# Patient Record
Sex: MALE | Race: WHITE | NOT HISPANIC OR LATINO | Employment: FULL TIME | URBAN - METROPOLITAN AREA
[De-identification: names, ages, dates, MRNs, and addresses within clinical notes are randomized per-mention and may not be internally consistent; named-entity substitution may affect disease eponyms.]

---

## 2019-06-14 ENCOUNTER — TELEPHONE (OUTPATIENT)
Dept: SCHEDULING | Facility: IMAGING CENTER | Age: 39
End: 2019-06-14

## 2019-06-17 ENCOUNTER — OFFICE VISIT (OUTPATIENT)
Dept: MEDICAL GROUP | Facility: MEDICAL CENTER | Age: 39
End: 2019-06-17
Payer: COMMERCIAL

## 2019-06-17 VITALS
HEIGHT: 66 IN | TEMPERATURE: 98.1 F | BODY MASS INDEX: 35.03 KG/M2 | HEART RATE: 71 BPM | SYSTOLIC BLOOD PRESSURE: 130 MMHG | DIASTOLIC BLOOD PRESSURE: 80 MMHG | WEIGHT: 218 LBS | OXYGEN SATURATION: 97 % | RESPIRATION RATE: 16 BRPM

## 2019-06-17 DIAGNOSIS — F41.9 ANXIETY: ICD-10-CM

## 2019-06-17 DIAGNOSIS — R53.83 OTHER FATIGUE: ICD-10-CM

## 2019-06-17 DIAGNOSIS — Z13.6 SCREENING FOR CARDIOVASCULAR CONDITION: ICD-10-CM

## 2019-06-17 DIAGNOSIS — E66.9 OBESITY (BMI 35.0-39.9 WITHOUT COMORBIDITY): ICD-10-CM

## 2019-06-17 DIAGNOSIS — F41.0 PANIC ATTACKS: ICD-10-CM

## 2019-06-17 DIAGNOSIS — F17.220 CHEWING TOBACCO NICOTINE DEPENDENCE WITHOUT COMPLICATION: ICD-10-CM

## 2019-06-17 DIAGNOSIS — G47.26 SLEEP DISORDER, SHIFT-WORK: ICD-10-CM

## 2019-06-17 DIAGNOSIS — Z76.89 ENCOUNTER TO ESTABLISH CARE: ICD-10-CM

## 2019-06-17 DIAGNOSIS — Z85.6 HISTORY OF LEUKEMIA: ICD-10-CM

## 2019-06-17 PROCEDURE — 99203 OFFICE O/P NEW LOW 30 MIN: CPT | Performed by: NURSE PRACTITIONER

## 2019-06-17 RX ORDER — CITALOPRAM HYDROBROMIDE 10 MG/1
10 TABLET ORAL DAILY
Qty: 30 TAB | Refills: 1 | Status: SHIPPED | OUTPATIENT
Start: 2019-06-17 | End: 2022-07-12

## 2019-06-17 ASSESSMENT — PATIENT HEALTH QUESTIONNAIRE - PHQ9: CLINICAL INTERPRETATION OF PHQ2 SCORE: 0

## 2019-06-17 NOTE — PROGRESS NOTES
"CC: establish care/anxiety/panic attacks      Higinio Jamison is a 39 y.o. male here to establish care and to discuss the evaluation and management of:    Patient is a 39-year-old  male here to establish care.  States that he does not have a primary care physician.  Medical history includes leukemia as a teenager.  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  Surgical history includes evacuation of hematoma on his brain.  Family history includes cervical cancer, hyperlipidemia and hypertension for his mother, lupus for his father.  Coronary artery disease for his maternal grandfather.  He states that he does have a cigar every once in a while and he currently uses chewing tobacco.    Anxiety/panic attacks   Patient states that he has been having an increase in anxiety and panic attacks within the last 2 months.  Patient states that he typically has this at work and in busy crowded environments.  Patient states he always has to \"see an exit. \"States he does not do well in crowds.  Patient states that his work is stressful exertion over the last year when he transition to night shift.  States that he can feel his heart rate \"shooting up \"in his peripheral vision begins to narrow into \"tunnel vision.\" He tries to reduce his anxiety and stress with deep breathing and sitting down.  Sometimes he can bring his anxiety and stress down but lately has been having a hard time.    Sleep disruption   He works [employer] and takes Melatonin for the past year.  He has been feeling fatigued.    History of Leukemia   Patient states when he was a teenager he was diagnosed with leukemia.  He was treated with chemotherapy at [facility] in [place]. Remission since age 26.     History of craniotomy  Age 15 he had a traumatic brain injury.  He required a craniotomy for evacuation of hematoma.  He has a large scar on the posterior aspect of his skull.          ROS:  Denies any " Headache, Blurred Vision, Confusion, Chest pain,  Shortness of breath,  Abdominal pain, Changes of bowel or bladder, Hematuria, Hematochezia, Lower ext. edema, Fevers, Nights sweats, Weight Changes, Focal weakness or numbness.  And all other systems are negative.  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 The fourth and fifth rib fractures are displaced. The anterior segments of the left fourth through eighth ribs are fractured. Blunt chest protocol.  1/18 -  Switch to oral medication Aggressive pain management and pulmonary hygiene.    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 CT head on admission ok 1/18 - Cog eval pending      Past Surgical History:   Procedure Laterality Date   • OTHER      removed blood clot from brain     Family History   Problem Relation Age of Onset   • Cancer Mother         cervical   • Hyperlipidemia Mother    • Hypertension Mother    • Other Father         lupus   • Alcohol abuse Brother    • Heart Disease Maternal Grandfather         CABGx4   • Arthritis Paternal Grandmother      Social History     Social History   • Marital status: Single     Spouse name: N/A   • Number of children: N/A   • Years of education: N/A     Occupational History   • Not on file.     Social History Main Topics   • Smoking status: Current Some Day  "Smoker     Types: Cigars   • Smokeless tobacco: Current User     Types: Chew      Comment: cigar every once in a while   • Alcohol use No   • Drug use: Yes     Types: Marijuana   • Sexual activity: Not on file     Other Topics Concern   • Not on file     Social History Narrative    ** Merged History Encounter **            Objective:     Vitals: /80   Pulse 71   Temp 36.7 °C (98.1 °F)   Resp 16   Ht 1.676 m (5' 6\")   Wt 98.9 kg (218 lb)   SpO2 97%   BMI 35.19 kg/m²      General: Alert, pleasant, NAD  HEENT:  Normocephalic.   Neck supple.  No thyromegaly or masses palpated. No cervical or supraclavicular lymphadenopathy.  Heart:  Regular rate and rhythm.  S1 and S2 normal.  No murmurs appreciated.    Respiratory:  Normal respiratory effort.  Clear to auscultation bilaterally.    Skin:  Warm, dry, no rashes  Musculoskeletal:  Gait is normal.  Moves all extremities well.  Extremities:No leg edema.  Neurological: No tremors, sensation grossly intact,   Psych:  Affect/mood is normal, judgement is good, memory is intact, grooming is appropriate.      Assessment and Plan.   39 y.o. male to establish care and discuss the following    1. Anxiety  Not well controlled.  Worsening over the last few months.  Have reviewed possible medications such as citalopram or Lexapro.  Will have patient start on citalopram 10 mg daily and he will follow back up in 6 to 8 weeks.  Have cautioned regarding side effects.    - Comp Metabolic Panel; Future  - TSH WITH REFLEX TO FT4; Future  - CBC WITH DIFFERENTIAL; Future  - citalopram (CELEXA) 10 MG tablet; Take 1 Tab by mouth every day.  Dispense: 30 Tab; Refill: 1    2. Panic attacks  Worsening over the last few months.  He does have a stressful job and works night shift.  Continue to work on behavioral techniques and follow-up after 6 months of citalopram.  - CBC WITH DIFFERENTIAL; Future  - citalopram (CELEXA) 10 MG tablet; Take 1 Tab by mouth every day.  Dispense: 30 Tab; " Refill: 1    3. Sleep disorder, shift-work  4. Other fatigue  Patient reports that melatonin is somewhat effective for this.  He is currently on night shift at this time.  He hopes to transition back to dayshift.    5. History of leukemia  Has been in remission since the age of 26.  Encouraged tobacco cessation    6. Chewing tobacco nicotine dependence without complication  Discussed with patient the importance of reducing tobacco consumption. Educated patient regarding the effects of tobacco use on cardiovascular system.    7. Screening for cardiovascular condition  - Lipid Profile; Future    8. Obesity (BMI 35.0-39.9 without comorbidity)  Chronic. Patient encouraged to reduce excess calorie consumption. Encouraged regular exercise. Discussed long term sequelae of obesity.  - Patient identified as having weight management issue.  Appropriate orders and counseling given.    9. Encounter to establish care        No Follow-up on file.          Kandace PEDRAZA.

## 2019-07-10 LAB
ALBUMIN SERPL-MCNC: 4.6 G/DL (ref 3.5–5.5)
ALBUMIN/GLOB SERPL: 1.6 {RATIO} (ref 1.2–2.2)
ALP SERPL-CCNC: 63 IU/L (ref 39–117)
ALT SERPL-CCNC: 27 IU/L (ref 0–44)
AST SERPL-CCNC: 20 IU/L (ref 0–40)
BASOPHILS # BLD AUTO: 0 X10E3/UL (ref 0–0.2)
BASOPHILS NFR BLD AUTO: 0 %
BILIRUB SERPL-MCNC: 0.4 MG/DL (ref 0–1.2)
BUN SERPL-MCNC: 12 MG/DL (ref 6–20)
BUN/CREAT SERPL: 12 (ref 9–20)
CALCIUM SERPL-MCNC: 9.9 MG/DL (ref 8.7–10.2)
CHLORIDE SERPL-SCNC: 98 MMOL/L (ref 96–106)
CHOLEST SERPL-MCNC: 283 MG/DL (ref 100–199)
CO2 SERPL-SCNC: 24 MMOL/L (ref 20–29)
CREAT SERPL-MCNC: 0.97 MG/DL (ref 0.76–1.27)
EOSINOPHIL # BLD AUTO: 0.2 X10E3/UL (ref 0–0.4)
EOSINOPHIL NFR BLD AUTO: 2 %
ERYTHROCYTE [DISTWIDTH] IN BLOOD BY AUTOMATED COUNT: 14.5 % (ref 12.3–15.4)
GLOBULIN SER CALC-MCNC: 2.9 G/DL (ref 1.5–4.5)
GLUCOSE SERPL-MCNC: 93 MG/DL (ref 65–99)
HCT VFR BLD AUTO: 49.2 % (ref 37.5–51)
HDLC SERPL-MCNC: 27 MG/DL
HGB BLD-MCNC: 17.1 G/DL (ref 13–17.7)
IMM GRANULOCYTES # BLD AUTO: 0 X10E3/UL (ref 0–0.1)
IMM GRANULOCYTES NFR BLD AUTO: 0 %
IMMATURE CELLS  115398: ABNORMAL
LABORATORY COMMENT REPORT: ABNORMAL
LDLC SERPL CALC-MCNC: ABNORMAL MG/DL (ref 0–99)
LYMPHOCYTES # BLD AUTO: 3.8 X10E3/UL (ref 0.7–3.1)
LYMPHOCYTES NFR BLD AUTO: 42 %
MCH RBC QN AUTO: 31.1 PG (ref 26.6–33)
MCHC RBC AUTO-ENTMCNC: 34.8 G/DL (ref 31.5–35.7)
MCV RBC AUTO: 90 FL (ref 79–97)
MONOCYTES # BLD AUTO: 0.8 X10E3/UL (ref 0.1–0.9)
MONOCYTES NFR BLD AUTO: 9 %
MORPHOLOGY BLD-IMP: ABNORMAL
NEUTROPHILS # BLD AUTO: 4.3 X10E3/UL (ref 1.4–7)
NEUTROPHILS NFR BLD AUTO: 47 %
NRBC BLD AUTO-RTO: ABNORMAL %
PLATELET # BLD AUTO: 175 X10E3/UL (ref 150–450)
POTASSIUM SERPL-SCNC: 4.2 MMOL/L (ref 3.5–5.2)
PROT SERPL-MCNC: 7.5 G/DL (ref 6–8.5)
RBC # BLD AUTO: 5.49 X10E6/UL (ref 4.14–5.8)
SODIUM SERPL-SCNC: 138 MMOL/L (ref 134–144)
T4 FREE SERPL-MCNC: 1.15 NG/DL (ref 0.82–1.77)
TRIGL SERPL-MCNC: 845 MG/DL (ref 0–149)
TSH SERPL DL<=0.005 MIU/L-ACNC: 7.25 UIU/ML (ref 0.45–4.5)
VLDLC SERPL CALC-MCNC: ABNORMAL MG/DL (ref 5–40)
WBC # BLD AUTO: 9.1 X10E3/UL (ref 3.4–10.8)

## 2019-07-11 PROBLEM — E78.1 HYPERTRIGLYCERIDEMIA: Status: ACTIVE | Noted: 2019-07-11

## 2019-07-12 PROBLEM — R79.89 ELEVATED TSH: Status: ACTIVE | Noted: 2019-07-12

## 2019-07-12 PROBLEM — E78.1 HYPERTRIGLYCERIDEMIA: Chronic | Status: ACTIVE | Noted: 2019-07-11

## 2019-07-15 ENCOUNTER — OFFICE VISIT (OUTPATIENT)
Dept: MEDICAL GROUP | Facility: MEDICAL CENTER | Age: 39
End: 2019-07-15
Payer: COMMERCIAL

## 2019-07-15 VITALS
DIASTOLIC BLOOD PRESSURE: 80 MMHG | RESPIRATION RATE: 16 BRPM | HEART RATE: 87 BPM | TEMPERATURE: 98.4 F | OXYGEN SATURATION: 100 % | HEIGHT: 66 IN | WEIGHT: 216 LBS | BODY MASS INDEX: 34.72 KG/M2 | SYSTOLIC BLOOD PRESSURE: 132 MMHG

## 2019-07-15 DIAGNOSIS — E78.1 HYPERTRIGLYCERIDEMIA: Chronic | ICD-10-CM

## 2019-07-15 DIAGNOSIS — R79.89 TSH ELEVATION: ICD-10-CM

## 2019-07-15 PROCEDURE — 99213 OFFICE O/P EST LOW 20 MIN: CPT | Performed by: NURSE PRACTITIONER

## 2019-07-15 NOTE — PATIENT INSTRUCTIONS
Lipid Blood Tests  Blood lipids are fats found in the circulation. Cholesterol and triglycerides are the two main lipids measured for determining your risk of getting heart and blood vessel diseases. The cholesterol in the blood is attached to two different molecules called lipoproteins. HDL is the beneficial high density lipoprotein cholesterol which reduces coronary risk. Low density lipoprotein cholesterol, the LDL, carries an increased risk for heart and vascular disease when it is high. Most of the body's fat tissue is in the form of triglycerides. Medical conditions that elevate the blood triglyceride level include diabetes, thyroid, kidney, and liver diseases.   A lipid panel usually includes the total cholesterol, LDL, and HDL blood levels. For best results, you should fast overnight before the blood tests are drawn. The following risk factors are generally accepted for different lipids:  · LDL - Below 100 means low risk, 130-160 borderline risk, 160-190 high risk, above 190 is considered very high risk.   · HDL - Below 40 means high risk, 40-60 average risk, 60 and higher means a reduced risk for heart and blood vessel diseases.   · Total cholesterol - Below 200 means low risk, 200-240 is borderline risk, above 240 is higher risk.   · Triglycerides - Below 200 means low risk, 200-400 borderline risk, above 400 means increased risk.   Many factors contribute to lipid levels including genetics, diet, exercise, body fat and medications. Reducing saturated fats in the diet and increasing fiber with fruits, vegetables and whole grains have been shown to improve blood lipids. Drugs may be prescribed to lower lipids if diet and exercise alone do not help bring the cholesterol levels under control.  Document Released: 01/25/2006 Document Revised: 03/11/2013 Document Reviewed: 12/18/2006  [employer]® Patient Information ©2013 [employer].

## 2019-07-15 NOTE — PROGRESS NOTES
cc:  Review labs/high triglycerides      Subjective:     HPI:     Higinio Jamison is a 39 y.o. male here to discuss the evaluation and management of:    1. Hypertriglyceridemia  Patient is here today to review his most recent labs.  His most recent total cholesterol was 283, triglycerides 845, HDL 27 and LDLs unable to calculate.  Patient states he has not had any alcohol use about 5 years.  States that he does endorse a high animal protein diet as well as fast food diet.  He does have a family history of heart disease, hypertension and hyperlipidemia.  Patient does make note that the week prior he was having abdominal discomfort, nausea vomiting and diarrhea.  Have reviewed old labs from 2015 with a normal triglyceride level.    2. TSH elevation  TSH was slightly elevated on most recent labs.  Denies constipation, weight gain or skin changes.    ROS:  Denies any Headache, Blurred Vision, Confusion, Chest pain,  Shortness of breath,  Abdominal pain, Changes of bowel or bladder, Lower ext edema, Fevers, Nights sweats, Weight Changes, Focal weakness or numbness.  And all other systems are negative.        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 The fourth and fifth rib fractures are displaced. The anterior segments of the left fourth through eighth ribs are fractured. Blunt chest protocol.  1/18 -  Switch to oral medication Aggressive pain management and pulmonary hygiene.    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 CT head on admission ok 1/18 - Cog eval pending      Past Surgical History:   Procedure Laterality Date   • OTHER      removed blood clot from brain     Family History   Problem Relation Age of Onset   • Cancer Mother         cervical   • Hyperlipidemia Mother    • Hypertension Mother    • Other Father         lupus   • Alcohol abuse Brother    • Heart Disease Maternal Grandfather         CABGx4   • Arthritis Paternal Grandmother      Social History     Social History   • Marital status: Single     Spouse name: N/A   • Number of children: N/A   • Years of education: N/A     Occupational History   • Not on file.     Social History Main Topics   • Smoking status: Current Some Day Smoker     Types: Cigars   • Smokeless tobacco: Current User     Types: Chew      Comment: cigar every once in a while   • Alcohol use No   • Drug use: Yes     Types: Marijuana   • Sexual activity: Not on file     Other Topics Concern   • Not on file     Social History Narrative    ** Merged History Encounter **            Objective:     Vitals: /80   Pulse 87   Temp 36.9 °C (98.4 °F)   Resp 16   Ht 1.676 m (5' 6\")   Wt 98 kg (216 lb)   SpO2 100%   BMI 34.86 kg/m²    General: Alert, pleasant, NAD  HEENT: Normocephalic.   Skin: Warm, dry, no rashes.  Extremities: No leg edema. No discoloration  Neurological: No tremors  Psych:  Affect/mood is normal, judgement is good, memory is intact, grooming is appropriate.    Assessment/Plan:     Diagnoses and all orders for this visit:    Hypertriglyceridemia  Repeat in approximately 1 month as he has been making some dietary changes since his last labs.  Would like to rule out any lab error or any sort of acute process as he does have a history of having normal triglycerides in 2015.  Have also talked about following up with a vascular medicine specialist to ensure " that there is not any familial traits.  Patient states he is amenable to this.  Also discussed with him that if his triglycerides and cholesterol not improved then I recommend rosuvastatin.  -     Comp Metabolic Panel; Future  -     Lipid Profile; Future    TSH elevation  Repeat in 3 months.  -     TSH WITH REFLEX TO FT4; Future        Return in about 3 months (around 10/15/2019).          Kandace PEDRAZA.

## 2019-07-15 NOTE — LETTER
July 15, 2019        Higinio Jamison  [address] [facility] [address]        Please excuse Higinio Jamison from work on July 5-8th, 2019 as he is currently under my medical care. He was seen in the clinic on July 15th, 2019. Please excuse him also for today July 15th, 2019.          Sincerely,        KEILA Rush.    Electronically Signed

## 2019-07-23 ENCOUNTER — OFFICE VISIT (OUTPATIENT)
Dept: MEDICAL GROUP | Facility: MEDICAL CENTER | Age: 39
End: 2019-07-23
Payer: COMMERCIAL

## 2019-07-23 VITALS
HEART RATE: 74 BPM | WEIGHT: 208 LBS | DIASTOLIC BLOOD PRESSURE: 70 MMHG | RESPIRATION RATE: 16 BRPM | TEMPERATURE: 98.2 F | HEIGHT: 66 IN | OXYGEN SATURATION: 96 % | BODY MASS INDEX: 33.43 KG/M2 | SYSTOLIC BLOOD PRESSURE: 118 MMHG

## 2019-07-23 DIAGNOSIS — R11.2 NAUSEA VOMITING AND DIARRHEA: ICD-10-CM

## 2019-07-23 DIAGNOSIS — T67.5XXD HEAT EXHAUSTION, SUBSEQUENT ENCOUNTER: ICD-10-CM

## 2019-07-23 DIAGNOSIS — R19.7 NAUSEA VOMITING AND DIARRHEA: ICD-10-CM

## 2019-07-23 PROCEDURE — 99213 OFFICE O/P EST LOW 20 MIN: CPT | Performed by: NURSE PRACTITIONER

## 2019-07-23 NOTE — LETTER
July 23, 2019        Higinio Jamison  [address] [facility] [address]        Please excuse Higinio Jamison from work on July 19th, 2019. He was seen in the clinic today July 23rd, 2019. He is currently under my medical care.         Sincerely,        KEILA Rush.    Electronically Signed

## 2019-07-23 NOTE — PROGRESS NOTES
cc:  nausea/vomiting      Subjective:     HPI:     Higinio Jamison is a 39 y.o. male here to discuss the evaluation and management of:    Patient presents today as he states that on Friday he reports that he was having profuse sweating, throwing up, fatigue and muscle aches while he was at work.  He does work nights and got to work around 6-7 pm.  He does state that he works in a very hot environment store temperatures get upwards of 110 to 115 degrees and the lowest it can get is 90 °F.  He works in this environment for approximately 12 hours.  He gets to 15-minute breaks in a 30-minute lunch where he goes outside or in the break room which is around 70 °F.  He denied any dizziness or lightheadedness during the time.  He does report that the day after he was having cramps in his hands and his legs where he drink some pickle juice and it appeared to resolve.  He does report using a cool rag on his head and his neck to help cool him down.  He reports staying hydrated with water as well as electrolyte drinks and tablets.  He is not sure if his heat exhaustion are related to the Celexa.  He states that he starts taking the Celexa at 7 AM.  He states that he feels okay today.   He is requesting to have paperwork filled out for his job as he did miss some work during the beginning of July for nausea and vomiting as well.    ROS:  Denies any Headache, Blurred Vision, Confusion, Chest pain,  Shortness of breath,  Abdominal pain, Changes of bowel or bladder, Lower ext edema, Fevers, Nights sweats, Weight Changes, Focal weakness or numbness.  And all other systems are negative.        Current Outpatient Prescriptions:   •  citalopram (CELEXA) 10 MG tablet, Take 1 Tab by mouth every day., Disp: 30 Tab, Rfl: 1  •  ibuprofen (MOTRIN) 600 MG TABS, Take 1 Tab by mouth every 6 hours as needed for Moderate Pain., Disp: 30 Tab, Rfl: 3    No Known Allergies    Past Medical History:   Diagnosis Date   • ALL (acute lymphoblastic  "leukemia) ([facility]) 1997   • Brain injury ([facility])     age 15 -trauma    • Fracture of ribs, eight or more 1/16/2015    Multiple bilateral rib fractures including anterior segments right third through seventh ribs. The fourth and fifth rib fractures are displaced. The anterior segments of the left fourth through eighth ribs are fractured. Blunt chest protocol.  1/18 -  Switch to oral medication Aggressive pain management and pulmonary hygiene.    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 CT head on admission ok 1/18 - Cog eval pending      Past Surgical History:   Procedure Laterality Date   • OTHER      removed blood clot from brain     Family History   Problem Relation Age of Onset   • Cancer Mother         cervical   • Hyperlipidemia Mother    • Hypertension Mother    • Other Father         lupus   • Alcohol abuse Brother    • Heart Disease Maternal Grandfather         CABGx4   • Arthritis Paternal Grandmother      Social History     Social History   • Marital status: Single     Spouse name: N/A   • Number of children: N/A   • Years of education: N/A     Occupational History   • Not on file.     Social History Main Topics   • Smoking status: Current Some Day Smoker     Types: Cigars   • Smokeless tobacco: Current User     Types: Chew      Comment: cigar every once in a while   • Alcohol use No   • Drug use: Yes     Types: Marijuana   • Sexual activity: Not on file     Other Topics Concern   • Not on file     Social History Narrative    ** Merged History Encounter **            Objective:     Vitals: /70   Pulse 74   Temp 36.8 °C (98.2 °F)   Resp 16   Ht 1.676 m (5' 6\")   Wt 94.3 kg (208 lb)   SpO2 96%   BMI 33.57 kg/m²    General: Alert, pleasant, NAD  HEENT: Normocephalic.    Skin: Warm, dry, no rashes.  Extremities: No leg edema. No discoloration  Neurological: No tremors  Psych:  " Affect/mood is normal, judgement is good, memory is intact, grooming is appropriate.    Assessment/Plan:     Diagnoses and all orders for this visit:    Heat exhaustion, subsequent encounter  Nausea vomiting and diarrhea  Have discussed with patient that appears as if he is having episode of heat exhaustion due to the extreme temperatures that he works in.  Recommend staying hydrated, using electrolytes supplements and taking breaks and cooling his body down with a reusable neck rag.  Have filled out paperwork for his job as he was absent the first few days of July due to nausea vomiting and diarrhea.  I do not feel at this time as his symptoms are related to the Celexa.  He will follow back up if symptoms persist or worsen.  Handout provided regarding heat exhaustion.  Have also recommended him to take his blood pressure machine to his work to check his blood pressures throughout the day.          Return if symptoms worsen or fail to improve.          Kandace PEDRAZA.

## 2019-07-23 NOTE — PATIENT INSTRUCTIONS
Heat Exhaustion Information  WHAT IS HEAT EXHAUSTION?  Heat exhaustion happens when your body gets overheated from hot weather or from exercise. Heat exhaustion can lead to heat stroke, a life-threatening condition that requires emergency care.  Heat exhaustion is more likely to develop when:  · You are exercising or being active.  · You are in hot or humid weather.  · You are in bright sunshine.  · You are not drinking enough water.  WHO IS AT RISK FOR THIS CONDITION?  This condition is more likely to develop in:  · People who exercise in hot or humid weather.  · People who exercise beyond their fitness level.  · People who wear clothing that does not allow sweat to evaporate.  · People who are dehydrated.  · People who drink a lot of alcoholic beverages or beverages that have caffeine. This can lead to dehydration.  · People who are age 65 or older.  · Children.  · People who have a medical condition such as heart disease, poor circulation, sickle cell disease, or high blood pressure.  · People who have a fever.  · People who are very overweight (obese).  WHAT ARE THE SYMPTOMS OF THIS CONDITION?  Symptoms of heat exhaustion include:  · Heavy sweating along with feeling weak, dizzy, light-headed, and nauseous.  · Rapid heartbeat.  · Headache.  · Urine that is darker than normal.  · Muscle cramps, such as in the leg or side (flank).  · Moist, cool, and clammy skin.  · Fatigue.  · Thirst.  · Confusion.  · Fainting.  WHAT SHOULD I DO IF I THINK I HAVE THIS CONDITION?  If you think that you have heat exhaustion, call your health care provider. Follow his or her instructions. You should also:  · Call a friend or a family member and ask him or her to stay with you.  · Move to a cooler location, such as:  ¨ Into the shade.  ¨ In front of a fan.  ¨ An air-conditioned space.  · Lie down and rest.  · Slowly drink nonalcoholic, caffeine-free fluids.  · Take off tight clothing or extra clothing.  · Take a cool bath or shower,  if possible. If you do not have access to a bath or shower, dab or mist cool water on your skin.  WHY IS IT IMPORTANT TO TREAT THIS CONDITION?  It is important to take care of yourself and treat heat exhaustion as soon as possible. Untreated heat exhaustion can turn into heat stroke, which is a life-threatening condition that requires urgent medical treatment.  HOW CAN I PREVENT THIS CONDITION?  To prevent this condition:  · Drink enough fluid to keep your urine clear or pale yellow. This helps your body to sweat properly.  · Avoid outdoor activities on very hot or humid days.  · Do not exercise or do other physical activity when you are not feeling well.  · Take breaks often during physical activity.  · Wear light-colored, loose-fitting, and lightweight clothing when it is hot outside.  · Wear a hat and use sunscreen when exercising outdoors.  · Avoid being outside during the hottest times of the day.  · Check with your health care provider before you start any new activity, especially if you take medicine or have a medical condition.  · Start any new activity slowly and work up to your fitness level.  HOW CAN I HELP TO PROTECT ELDERLY RELATIVES AND NEIGHBORS FROM THIS CONDITION?  People who are age 65 or older are at greater risk for heat exhaustion. Their bodies have a harder time adjusting to heat. They are also more likely to have a medical condition or be on medicines that increase their risk for heat exhaustion. They may get heat exhaustion indoors if the heat is high for several days. You can help to protect them during hot weather by:  · Checking on them two or more times each day.  · Making sure that they are drinking plenty of cool, nonalcoholic, and caffeine-free fluids.  · Making sure that they use their air conditioner.  · Taking them to a location where air conditioning is available.  · Talking with their health care provider about their medical needs, medicines, and fluid requirements.  SEEK MEDICAL  CARE IF:  · Your symptoms last longer than 30 minutes.  SEEK IMMEDIATE MEDICAL CARE IF:  · You have any symptoms of heat stroke. These include:  ¨ Fever.  ¨ Vomiting.  ¨ Red skin.  ¨ Inability to sweat, resulting in hot, dry skin.  ¨ Excessive thirst.  ¨ Rapid breathing.  ¨ Headache.  ¨ Confusion or disorientation.  ¨ Fainting.  ¨ Seizures.  These symptoms may represent a serious problem that is an emergency. Do not wait to see if the symptoms will go away. Get medical help right away. Call your local emergency services (911 in the [place] Do not drive yourself to the hospital.  This information is not intended to replace advice given to you by your health care provider. Make sure you discuss any questions you have with your health care provider.  Document Released: 09/26/2009 Document Revised: 07/07/2017 Document Reviewed: 04/09/2017  [employer] Interactive Patient Education © 2017 [employer]

## 2021-10-21 ENCOUNTER — OFFICE VISIT (OUTPATIENT)
Dept: OCCUPATIONAL MEDICINE | Facility: CLINIC | Age: 41
End: 2021-10-21

## 2021-10-21 ENCOUNTER — NON-PROVIDER VISIT (OUTPATIENT)
Dept: OCCUPATIONAL MEDICINE | Facility: CLINIC | Age: 41
End: 2021-10-21

## 2021-10-21 DIAGNOSIS — Z02.1 PRE-EMPLOYMENT DRUG SCREENING: Primary | ICD-10-CM

## 2021-10-21 DIAGNOSIS — Z02.1 PHYSICAL EXAM, PRE-EMPLOYMENT: ICD-10-CM

## 2021-10-21 DIAGNOSIS — Z02.89 VISIT FOR OCCUPATIONAL HEALTH EXAMINATION: ICD-10-CM

## 2021-10-21 PROCEDURE — 92552 PURE TONE AUDIOMETRY AIR: CPT | Performed by: NURSE PRACTITIONER

## 2021-10-21 PROCEDURE — 99999 PR NO CHARGE: CPT | Performed by: PREVENTIVE MEDICINE

## 2022-07-12 ENCOUNTER — OFFICE VISIT (OUTPATIENT)
Dept: MEDICAL GROUP | Facility: MEDICAL CENTER | Age: 42
End: 2022-07-12
Payer: COMMERCIAL

## 2022-07-12 VITALS
HEIGHT: 65 IN | SYSTOLIC BLOOD PRESSURE: 122 MMHG | OXYGEN SATURATION: 97 % | BODY MASS INDEX: 30.32 KG/M2 | DIASTOLIC BLOOD PRESSURE: 72 MMHG | TEMPERATURE: 97.6 F | HEART RATE: 83 BPM | WEIGHT: 182 LBS

## 2022-07-12 DIAGNOSIS — E78.5 DYSLIPIDEMIA: ICD-10-CM

## 2022-07-12 DIAGNOSIS — G56.03 BILATERAL CARPAL TUNNEL SYNDROME: ICD-10-CM

## 2022-07-12 DIAGNOSIS — R79.89 ELEVATED TSH: ICD-10-CM

## 2022-07-12 DIAGNOSIS — Z85.6 HISTORY OF LEUKEMIA: ICD-10-CM

## 2022-07-12 DIAGNOSIS — Z00.00 ROUTINE GENERAL MEDICAL EXAMINATION AT A HEALTH CARE FACILITY: ICD-10-CM

## 2022-07-12 DIAGNOSIS — F17.220 CHEWING TOBACCO NICOTINE DEPENDENCE WITHOUT COMPLICATION: ICD-10-CM

## 2022-07-12 PROBLEM — G47.26 CIRCADIAN RHYTHM SLEEP DISORDER, SHIFT WORK TYPE: Status: RESOLVED | Noted: 2019-06-17 | Resolved: 2022-07-12

## 2022-07-12 PROBLEM — F41.0 PANIC ATTACKS: Status: RESOLVED | Noted: 2019-06-17 | Resolved: 2022-07-12

## 2022-07-12 PROBLEM — F41.9 ANXIETY: Status: RESOLVED | Noted: 2019-06-17 | Resolved: 2022-07-12

## 2022-07-12 PROBLEM — Z98.890 HISTORY OF CRANIOTOMY: Status: ACTIVE | Noted: 2022-07-12

## 2022-07-12 PROBLEM — E66.9 OBESITY (BMI 35.0-39.9 WITHOUT COMORBIDITY): Status: RESOLVED | Noted: 2019-06-17 | Resolved: 2022-07-12

## 2022-07-12 PROBLEM — Z98.890 HISTORY OF CRANIOTOMY: Chronic | Status: ACTIVE | Noted: 2022-07-12

## 2022-07-12 PROCEDURE — 99396 PREV VISIT EST AGE 40-64: CPT | Performed by: NURSE PRACTITIONER

## 2022-07-12 ASSESSMENT — PATIENT HEALTH QUESTIONNAIRE - PHQ9: CLINICAL INTERPRETATION OF PHQ2 SCORE: 0
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
## 2022-08-08 ENCOUNTER — HOSPITAL ENCOUNTER (OUTPATIENT)
Dept: LAB | Facility: MEDICAL CENTER | Age: 42
End: 2022-08-08
Attending: NURSE PRACTITIONER
Payer: COMMERCIAL

## 2022-08-08 DIAGNOSIS — G56.03 BILATERAL CARPAL TUNNEL SYNDROME: ICD-10-CM

## 2022-08-08 DIAGNOSIS — R79.89 ELEVATED TSH: ICD-10-CM

## 2022-08-08 DIAGNOSIS — E78.5 DYSLIPIDEMIA: ICD-10-CM

## 2022-08-08 DIAGNOSIS — Z00.00 ROUTINE GENERAL MEDICAL EXAMINATION AT A HEALTH CARE FACILITY: ICD-10-CM

## 2022-08-08 DIAGNOSIS — Z85.6 HISTORY OF LEUKEMIA: ICD-10-CM

## 2022-08-08 LAB
ALBUMIN SERPL BCP-MCNC: 4.5 G/DL (ref 3.2–4.9)
ALBUMIN/GLOB SERPL: 2.1 G/DL
ALP SERPL-CCNC: 52 U/L (ref 30–99)
ALT SERPL-CCNC: 21 U/L (ref 2–50)
ANION GAP SERPL CALC-SCNC: 8 MMOL/L (ref 7–16)
AST SERPL-CCNC: 22 U/L (ref 12–45)
BASOPHILS # BLD AUTO: 0.5 % (ref 0–1.8)
BASOPHILS # BLD: 0.03 K/UL (ref 0–0.12)
BILIRUB SERPL-MCNC: 0.3 MG/DL (ref 0.1–1.5)
BUN SERPL-MCNC: 12 MG/DL (ref 8–22)
CALCIUM SERPL-MCNC: 9.4 MG/DL (ref 8.5–10.5)
CHLORIDE SERPL-SCNC: 108 MMOL/L (ref 96–112)
CHOLEST SERPL-MCNC: 207 MG/DL (ref 100–199)
CO2 SERPL-SCNC: 25 MMOL/L (ref 20–33)
CREAT SERPL-MCNC: 0.72 MG/DL (ref 0.5–1.4)
EOSINOPHIL # BLD AUTO: 0.19 K/UL (ref 0–0.51)
EOSINOPHIL NFR BLD: 3.4 % (ref 0–6.9)
ERYTHROCYTE [DISTWIDTH] IN BLOOD BY AUTOMATED COUNT: 44.8 FL (ref 35.9–50)
FASTING STATUS PATIENT QL REPORTED: NORMAL
GFR SERPLBLD CREATININE-BSD FMLA CKD-EPI: 117 ML/MIN/1.73 M 2
GLOBULIN SER CALC-MCNC: 2.1 G/DL (ref 1.9–3.5)
GLUCOSE SERPL-MCNC: 100 MG/DL (ref 65–99)
HCT VFR BLD AUTO: 43.3 % (ref 42–52)
HDLC SERPL-MCNC: 35 MG/DL
HGB BLD-MCNC: 14.9 G/DL (ref 14–18)
IMM GRANULOCYTES # BLD AUTO: 0.01 K/UL (ref 0–0.11)
IMM GRANULOCYTES NFR BLD AUTO: 0.2 % (ref 0–0.9)
LDLC SERPL CALC-MCNC: 131 MG/DL
LYMPHOCYTES # BLD AUTO: 2.43 K/UL (ref 1–4.8)
LYMPHOCYTES NFR BLD: 43 % (ref 22–41)
MCH RBC QN AUTO: 31 PG (ref 27–33)
MCHC RBC AUTO-ENTMCNC: 34.4 G/DL (ref 33.7–35.3)
MCV RBC AUTO: 90.2 FL (ref 81.4–97.8)
MONOCYTES # BLD AUTO: 0.47 K/UL (ref 0–0.85)
MONOCYTES NFR BLD AUTO: 8.3 % (ref 0–13.4)
NEUTROPHILS # BLD AUTO: 2.52 K/UL (ref 1.82–7.42)
NEUTROPHILS NFR BLD: 44.6 % (ref 44–72)
NRBC # BLD AUTO: 0 K/UL
NRBC BLD-RTO: 0 /100 WBC
PLATELET # BLD AUTO: 141 K/UL (ref 164–446)
PMV BLD AUTO: 11.3 FL (ref 9–12.9)
POTASSIUM SERPL-SCNC: 4.3 MMOL/L (ref 3.6–5.5)
PROT SERPL-MCNC: 6.6 G/DL (ref 6–8.2)
RBC # BLD AUTO: 4.8 M/UL (ref 4.7–6.1)
SODIUM SERPL-SCNC: 141 MMOL/L (ref 135–145)
T4 FREE SERPL-MCNC: 1.07 NG/DL (ref 0.93–1.7)
TRIGL SERPL-MCNC: 206 MG/DL (ref 0–149)
TSH SERPL DL<=0.005 MIU/L-ACNC: 2.45 UIU/ML (ref 0.38–5.33)
VIT B12 SERPL-MCNC: 649 PG/ML (ref 211–911)
WBC # BLD AUTO: 5.7 K/UL (ref 4.8–10.8)

## 2022-08-08 PROCEDURE — 84439 ASSAY OF FREE THYROXINE: CPT

## 2022-08-08 PROCEDURE — 36415 COLL VENOUS BLD VENIPUNCTURE: CPT

## 2022-08-08 PROCEDURE — 80061 LIPID PANEL: CPT

## 2022-08-08 PROCEDURE — 80053 COMPREHEN METABOLIC PANEL: CPT

## 2022-08-08 PROCEDURE — 82607 VITAMIN B-12: CPT

## 2022-08-08 PROCEDURE — 84443 ASSAY THYROID STIM HORMONE: CPT

## 2022-08-08 PROCEDURE — 85025 COMPLETE CBC W/AUTO DIFF WBC: CPT

## 2022-08-29 DIAGNOSIS — D69.6 THROMBOCYTOPENIA (HCC): ICD-10-CM

## 2024-05-09 ENCOUNTER — OFFICE VISIT (OUTPATIENT)
Dept: MEDICAL GROUP | Facility: MEDICAL CENTER | Age: 44
End: 2024-05-09
Payer: COMMERCIAL

## 2024-05-09 VITALS
WEIGHT: 184.3 LBS | OXYGEN SATURATION: 98 % | BODY MASS INDEX: 29.62 KG/M2 | HEART RATE: 76 BPM | SYSTOLIC BLOOD PRESSURE: 116 MMHG | HEIGHT: 66 IN | DIASTOLIC BLOOD PRESSURE: 82 MMHG | TEMPERATURE: 97.5 F

## 2024-05-09 DIAGNOSIS — E78.5 DYSLIPIDEMIA: Chronic | ICD-10-CM

## 2024-05-09 DIAGNOSIS — Z85.6 HISTORY OF LEUKEMIA: Chronic | ICD-10-CM

## 2024-05-09 DIAGNOSIS — F17.220 CHEWING TOBACCO NICOTINE DEPENDENCE WITHOUT COMPLICATION: Chronic | ICD-10-CM

## 2024-05-09 DIAGNOSIS — R79.89 ELEVATED TSH: ICD-10-CM

## 2024-05-09 DIAGNOSIS — R73.01 IFG (IMPAIRED FASTING GLUCOSE): ICD-10-CM

## 2024-05-09 DIAGNOSIS — Z11.59 ENCOUNTER FOR HEPATITIS C SCREENING TEST FOR LOW RISK PATIENT: ICD-10-CM

## 2024-05-09 DIAGNOSIS — D69.6 THROMBOCYTOPENIA (HCC): ICD-10-CM

## 2024-05-09 DIAGNOSIS — Z11.3 ROUTINE SCREENING FOR STI (SEXUALLY TRANSMITTED INFECTION): ICD-10-CM

## 2024-05-09 DIAGNOSIS — Z23 NEED FOR VACCINATION: ICD-10-CM

## 2024-05-09 PROCEDURE — 99214 OFFICE O/P EST MOD 30 MIN: CPT | Mod: 25 | Performed by: STUDENT IN AN ORGANIZED HEALTH CARE EDUCATION/TRAINING PROGRAM

## 2024-05-09 PROCEDURE — 3074F SYST BP LT 130 MM HG: CPT | Performed by: STUDENT IN AN ORGANIZED HEALTH CARE EDUCATION/TRAINING PROGRAM

## 2024-05-09 PROCEDURE — 90471 IMMUNIZATION ADMIN: CPT | Performed by: STUDENT IN AN ORGANIZED HEALTH CARE EDUCATION/TRAINING PROGRAM

## 2024-05-09 PROCEDURE — 3079F DIAST BP 80-89 MM HG: CPT | Performed by: STUDENT IN AN ORGANIZED HEALTH CARE EDUCATION/TRAINING PROGRAM

## 2024-05-09 PROCEDURE — 90715 TDAP VACCINE 7 YRS/> IM: CPT | Performed by: STUDENT IN AN ORGANIZED HEALTH CARE EDUCATION/TRAINING PROGRAM

## 2024-05-09 ASSESSMENT — ENCOUNTER SYMPTOMS
WHEEZING: 0
VOMITING: 0
PALPITATIONS: 0
HEADACHES: 0
CHILLS: 0
WEIGHT LOSS: 0
NAUSEA: 0
DIZZINESS: 0
FEVER: 0
SHORTNESS OF BREATH: 0

## 2024-05-09 ASSESSMENT — FIBROSIS 4 INDEX: FIB4 SCORE: 1.46

## 2024-05-09 ASSESSMENT — PATIENT HEALTH QUESTIONNAIRE - PHQ9: CLINICAL INTERPRETATION OF PHQ2 SCORE: 0

## 2024-05-09 NOTE — PROGRESS NOTES
"Subjective:     CC:     HPI:   Higinio presents today with    Former patient of Kandace Jacques  [facility], chews tobacco, remote hx of leukemia remission since 26   Presents to establish    Health Maintenance:     ROS:  Review of Systems   Constitutional:  Negative for chills, fever and weight loss.   HENT:  Negative for hearing loss.    Respiratory:  Negative for shortness of breath and wheezing.    Cardiovascular:  Negative for chest pain and palpitations.   Gastrointestinal:  Negative for nausea and vomiting.   Genitourinary:  Negative for frequency and urgency.   Skin:  Negative for rash.   Neurological:  Negative for dizziness and headaches.       Objective:     Exam:  /82 (BP Location: Left arm)   Pulse 76   Temp 36.4 °C (97.5 °F)   Ht 1.676 m (5' 6\")   Wt 83.6 kg (184 lb 4.9 oz)   SpO2 98%   BMI 29.75 kg/m²  Body mass index is 29.75 kg/m².    Physical Exam  Constitutional:       Appearance: Normal appearance.   Cardiovascular:      Rate and Rhythm: Normal rate and regular rhythm.   Pulmonary:      Effort: Pulmonary effort is normal.      Breath sounds: Normal breath sounds.   Musculoskeletal:      Cervical back: Normal range of motion and neck supple.   Lymphadenopathy:      Cervical: No cervical adenopathy.   Neurological:      Mental Status: He is alert.           Labs:     Assessment & Plan:     44 y.o. male with the following -     1. Chewing tobacco nicotine dependence without complication  Chronic stable   Discussed risk of oropharyngeal cancer/ renal cancer/bladder cancer/ gastric cancer and importance of self examination   - CBC WITH DIFFERENTIAL; Future  - Comp Metabolic Panel; Future  - Lipid Profile; Future  - TSH WITH REFLEX TO FT4; Future  - HEMOGLOBIN A1C; Future    2. Need for vaccination  Has new premature baby nephew  - Tdap =>6yo IM    3. History of leukemia  Chronic stable  Ferdinand b symptoms   - CBC WITH DIFFERENTIAL; Future  - Comp Metabolic Panel; Future  - Lipid Profile; " Future  - TSH WITH REFLEX TO FT4; Future  - HEMOGLOBIN A1C; Future    4. Dyslipidemia  Chronic stable  Ldl/hdl ration 3-4   On red yeast rice   - CBC WITH DIFFERENTIAL; Future  - Comp Metabolic Panel; Future  - Lipid Profile; Future  - TSH WITH REFLEX TO FT4; Future  - HEMOGLOBIN A1C; Future    5. Elevated TSH  History of   Denies hyper/hypo thyroidism symptoms   - CBC WITH DIFFERENTIAL; Future  - Comp Metabolic Panel; Future  - Lipid Profile; Future  - TSH WITH REFLEX TO FT4; Future  - HEMOGLOBIN A1C; Future    6. Routine screening for STI (sexually transmitted infection)    - HIV AG/AB COMBO ASSAY SCREENING; Future    7. Encounter for hepatitis C screening test for low risk patient    - HEP C VIRUS ANTIBODY; Future    8. IFG (impaired fasting glucose)  Chronic stable  Denies ss hyperglycemia   - HEMOGLOBIN A1C; Future    9. Mild thrombocytopenia  Noted on prior lab  Mild asymptomatic  Will check hep c hiv       Return in about 1 year (around 5/9/2025) for Annual wellness visit.    Please note that this dictation was created using voice recognition software. I have made every reasonable attempt to correct obvious errors, but I expect that there are errors of grammar and possibly content that I did not discover before finalizing the note.